# Patient Record
Sex: MALE
[De-identification: names, ages, dates, MRNs, and addresses within clinical notes are randomized per-mention and may not be internally consistent; named-entity substitution may affect disease eponyms.]

---

## 2020-02-10 ENCOUNTER — HOSPITAL ENCOUNTER (OUTPATIENT)
Dept: HOSPITAL 95 - ORSCMMR | Age: 55
Discharge: HOME | End: 2020-02-10
Attending: INTERNAL MEDICINE
Payer: COMMERCIAL

## 2020-02-10 VITALS — HEIGHT: 75 IN | BODY MASS INDEX: 30.29 KG/M2 | WEIGHT: 243.61 LBS

## 2020-02-10 DIAGNOSIS — K63.5: ICD-10-CM

## 2020-02-10 DIAGNOSIS — D12.2: ICD-10-CM

## 2020-02-10 DIAGNOSIS — K62.1: ICD-10-CM

## 2020-02-10 DIAGNOSIS — Z12.11: Primary | ICD-10-CM

## 2020-02-10 PROCEDURE — 0DBK8ZX EXCISION OF ASCENDING COLON, VIA NATURAL OR ARTIFICIAL OPENING ENDOSCOPIC, DIAGNOSTIC: ICD-10-PCS | Performed by: INTERNAL MEDICINE

## 2020-02-10 PROCEDURE — 0DBM8ZX EXCISION OF DESCENDING COLON, VIA NATURAL OR ARTIFICIAL OPENING ENDOSCOPIC, DIAGNOSTIC: ICD-10-PCS | Performed by: INTERNAL MEDICINE

## 2020-02-10 PROCEDURE — 0DBP8ZX EXCISION OF RECTUM, VIA NATURAL OR ARTIFICIAL OPENING ENDOSCOPIC, DIAGNOSTIC: ICD-10-PCS | Performed by: INTERNAL MEDICINE

## 2020-02-10 NOTE — NUR
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
History, Chart, Medications and Allergies reviewed before start of
procedure.3-LEAD EKG REVIEWED WITH PHYSICIAN PRIOR TO START OF
PROCEDURE.O2 VIA N/C INTACT THROUGHOUT SEDATION/PROCEDURE. MONITOR
INTACT WITH CONTINUOUS PULSE OXIMETRY AND INTERMITTENT BP.PATIENT
DETERMINED TO BE ASA APPROPRIATE FOR PROPOFOL SEDATION PRIOR TO START
OF PROCEDURE BY .

## 2020-02-10 NOTE — NUR
Ambulatory in Day Surgery
Patient states colon prep results clear.
History, Chart, Medications and Allergies reviewed before start of
procedure.
Pre-Op teaching done. Pt verbalizes understanding.
Patient States Post-Procedure ride home has been arranged.

## 2020-02-10 NOTE — NUR
ALERT, TOLERATING CRANBERRY JUICE. DR CARLTON AT BEDSIDE SPEAKING TO PATIENT.
Discharge instructions reviewed with patient. Patient verbalizes understanding.
Copy given to patient to take home.

## 2020-02-10 NOTE — NUR
UP TO DRESS WITH STEADY GAIT. GLASSES GIVEN TO PATIENT. PATIENT DECLINES NURSE
FOLLOW-UP PHONE CALL TOMORROW. DR CARLTON AND DAY SURGERY PHONE NUMBERS LISTED
ON DISCHARGE PAPERWORK AND GIVEN TO PATIENT. TRANSPORT TO PERSONAL CAR VIA
W/C. PATIENT HAS RESPONSIBLE ADULT HERE TO TRANSPORT HOME.

## 2020-11-19 ENCOUNTER — HOSPITAL ENCOUNTER (OUTPATIENT)
Dept: HOSPITAL 95 - LAB SHORT | Age: 55
Discharge: HOME | End: 2020-11-19
Payer: COMMERCIAL

## 2020-11-19 DIAGNOSIS — R19.7: Primary | ICD-10-CM
